# Patient Record
Sex: FEMALE | Race: BLACK OR AFRICAN AMERICAN | NOT HISPANIC OR LATINO | Employment: FULL TIME | URBAN - METROPOLITAN AREA
[De-identification: names, ages, dates, MRNs, and addresses within clinical notes are randomized per-mention and may not be internally consistent; named-entity substitution may affect disease eponyms.]

---

## 2021-04-28 ENCOUNTER — PATIENT MESSAGE (OUTPATIENT)
Dept: RESEARCH | Facility: HOSPITAL | Age: 41
End: 2021-04-28

## 2024-04-08 ENCOUNTER — OFFICE VISIT (OUTPATIENT)
Dept: NEUROLOGY | Facility: CLINIC | Age: 44
End: 2024-04-08
Payer: COMMERCIAL

## 2024-04-08 VITALS
SYSTOLIC BLOOD PRESSURE: 118 MMHG | BODY MASS INDEX: 35.43 KG/M2 | HEART RATE: 75 BPM | RESPIRATION RATE: 16 BRPM | HEIGHT: 66 IN | WEIGHT: 220.44 LBS | OXYGEN SATURATION: 99 % | DIASTOLIC BLOOD PRESSURE: 84 MMHG

## 2024-04-08 DIAGNOSIS — D17.1 LIPOMA OF TORSO: ICD-10-CM

## 2024-04-08 DIAGNOSIS — K44.9 HIATAL HERNIA: ICD-10-CM

## 2024-04-08 DIAGNOSIS — M54.18 LEFT SACRAL RADICULOPATHY: Primary | ICD-10-CM

## 2024-04-08 DIAGNOSIS — Q39.4 TERMINAL ESOPHAGEAL WEB: ICD-10-CM

## 2024-04-08 DIAGNOSIS — R20.2 PARESTHESIAS: ICD-10-CM

## 2024-04-08 DIAGNOSIS — N60.12 DIFFUSE CYSTIC MASTOPATHY OF BOTH BREASTS: ICD-10-CM

## 2024-04-08 DIAGNOSIS — J30.89 ENVIRONMENTAL AND SEASONAL ALLERGIES: ICD-10-CM

## 2024-04-08 DIAGNOSIS — S32.2XXS CLOSED FRACTURE OF SACRUM AND COCCYX, SEQUELA: ICD-10-CM

## 2024-04-08 DIAGNOSIS — S32.10XS CLOSED FRACTURE OF SACRUM AND COCCYX, SEQUELA: ICD-10-CM

## 2024-04-08 DIAGNOSIS — D50.8 IRON DEFICIENCY ANEMIA SECONDARY TO INADEQUATE DIETARY IRON INTAKE: ICD-10-CM

## 2024-04-08 DIAGNOSIS — F32.A ANXIETY AND DEPRESSION: ICD-10-CM

## 2024-04-08 DIAGNOSIS — N60.11 DIFFUSE CYSTIC MASTOPATHY OF BOTH BREASTS: ICD-10-CM

## 2024-04-08 DIAGNOSIS — R73.03 PREDIABETES: ICD-10-CM

## 2024-04-08 DIAGNOSIS — Z80.3 FAMILY HISTORY OF BREAST CANCER: ICD-10-CM

## 2024-04-08 DIAGNOSIS — F41.9 ANXIETY AND DEPRESSION: ICD-10-CM

## 2024-04-08 DIAGNOSIS — N83.209 CYST OF OVARY, UNSPECIFIED LATERALITY: ICD-10-CM

## 2024-04-08 DIAGNOSIS — F51.01 PRIMARY INSOMNIA: ICD-10-CM

## 2024-04-08 DIAGNOSIS — M25.552 LEFT HIP PAIN: ICD-10-CM

## 2024-04-08 PROBLEM — S32.2XXA FX SACRUM/COCCYX-CLOSED: Status: ACTIVE | Noted: 2024-04-08

## 2024-04-08 PROBLEM — F32.0 MILD MAJOR DEPRESSION: Status: ACTIVE | Noted: 2021-02-26

## 2024-04-08 PROBLEM — E61.1 IRON DEFICIENCY: Status: RESOLVED | Noted: 2017-05-31 | Resolved: 2024-04-08

## 2024-04-08 PROBLEM — E61.1 IRON DEFICIENCY: Status: ACTIVE | Noted: 2017-05-31

## 2024-04-08 PROBLEM — S32.10XA FX SACRUM/COCCYX-CLOSED: Status: ACTIVE | Noted: 2024-04-08

## 2024-04-08 PROBLEM — D64.9 ANEMIA: Status: ACTIVE | Noted: 2024-04-08

## 2024-04-08 PROBLEM — F32.0 MILD MAJOR DEPRESSION: Status: RESOLVED | Noted: 2021-02-26 | Resolved: 2024-04-08

## 2024-04-08 PROCEDURE — 99205 OFFICE O/P NEW HI 60 MIN: CPT | Mod: S$GLB,,, | Performed by: PSYCHIATRY & NEUROLOGY

## 2024-04-08 PROCEDURE — 3044F HG A1C LEVEL LT 7.0%: CPT | Mod: CPTII,S$GLB,, | Performed by: PSYCHIATRY & NEUROLOGY

## 2024-04-08 PROCEDURE — 3008F BODY MASS INDEX DOCD: CPT | Mod: CPTII,S$GLB,, | Performed by: PSYCHIATRY & NEUROLOGY

## 2024-04-08 PROCEDURE — 1159F MED LIST DOCD IN RCRD: CPT | Mod: CPTII,S$GLB,, | Performed by: PSYCHIATRY & NEUROLOGY

## 2024-04-08 PROCEDURE — 3074F SYST BP LT 130 MM HG: CPT | Mod: CPTII,S$GLB,, | Performed by: PSYCHIATRY & NEUROLOGY

## 2024-04-08 PROCEDURE — 3079F DIAST BP 80-89 MM HG: CPT | Mod: CPTII,S$GLB,, | Performed by: PSYCHIATRY & NEUROLOGY

## 2024-04-08 PROCEDURE — 99999 PR PBB SHADOW E&M-EST. PATIENT-LVL IV: CPT | Mod: PBBFAC,,, | Performed by: PSYCHIATRY & NEUROLOGY

## 2024-04-08 RX ORDER — TIRZEPATIDE 2.5 MG/.5ML
2.5 INJECTION, SOLUTION SUBCUTANEOUS
COMMUNITY
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
## 2024-04-17 ENCOUNTER — PROCEDURE VISIT (OUTPATIENT)
Dept: NEUROLOGY | Facility: CLINIC | Age: 44
End: 2024-04-17
Payer: COMMERCIAL

## 2024-04-17 ENCOUNTER — PATIENT MESSAGE (OUTPATIENT)
Dept: NEUROLOGY | Facility: CLINIC | Age: 44
End: 2024-04-17

## 2024-04-17 DIAGNOSIS — R20.2 PARESTHESIAS: ICD-10-CM

## 2024-04-17 DIAGNOSIS — S32.2XXS CLOSED FRACTURE OF SACRUM AND COCCYX, SEQUELA: ICD-10-CM

## 2024-04-17 DIAGNOSIS — M54.18 LEFT SACRAL RADICULOPATHY: ICD-10-CM

## 2024-04-17 DIAGNOSIS — S32.10XS CLOSED FRACTURE OF SACRUM AND COCCYX, SEQUELA: ICD-10-CM

## 2024-04-17 PROCEDURE — 95910 NRV CNDJ TEST 7-8 STUDIES: CPT | Mod: S$GLB,,, | Performed by: PSYCHIATRY & NEUROLOGY
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
## 2024-04-26 ENCOUNTER — TELEPHONE (OUTPATIENT)
Dept: NEUROLOGY | Facility: CLINIC | Age: 44
End: 2024-04-26
Payer: COMMERCIAL

## 2024-04-26 ENCOUNTER — HOSPITAL ENCOUNTER (OUTPATIENT)
Dept: RADIOLOGY | Facility: HOSPITAL | Age: 44
Discharge: HOME | End: 2024-04-26
Attending: PSYCHIATRY & NEUROLOGY
Payer: COMMERCIAL

## 2024-04-26 DIAGNOSIS — S32.10XS CLOSED FRACTURE OF SACRUM AND COCCYX, SEQUELA: ICD-10-CM

## 2024-04-26 DIAGNOSIS — M54.18 LEFT SACRAL RADICULOPATHY: ICD-10-CM

## 2024-04-26 DIAGNOSIS — S32.2XXS CLOSED FRACTURE OF SACRUM AND COCCYX, SEQUELA: ICD-10-CM

## 2024-04-26 DIAGNOSIS — R20.2 PARESTHESIAS: ICD-10-CM

## 2024-04-26 PROCEDURE — 25500020 PHARM REV CODE 255: Performed by: PSYCHIATRY & NEUROLOGY

## 2024-04-26 PROCEDURE — 72158 MRI LUMBAR SPINE W/O & W/DYE: CPT | Mod: 26,,, | Performed by: RADIOLOGY

## 2024-04-26 PROCEDURE — 72158 MRI LUMBAR SPINE W/O & W/DYE: CPT | Mod: TC

## 2024-04-26 PROCEDURE — A9585 GADOBUTROL INJECTION: HCPCS | Performed by: PSYCHIATRY & NEUROLOGY

## 2024-04-26 RX ORDER — GADOBUTROL 604.72 MG/ML
10 INJECTION INTRAVENOUS
Status: COMPLETED | OUTPATIENT
Start: 2024-04-26 | End: 2024-04-26

## 2024-04-26 RX ADMIN — GADOBUTROL 10 ML: 604.72 INJECTION INTRAVENOUS at 12:04

## 2024-04-26 NOTE — TELEPHONE ENCOUNTER
----- Message from Crissy Menchaca MD sent at 4/26/2024  4:19 PM CDT -----    04-    LS-Spine MRI Chronic DDD L5-S1. No acute (new changes).

## 2024-05-06 ENCOUNTER — TELEPHONE (OUTPATIENT)
Dept: NEUROLOGY | Facility: CLINIC | Age: 44
End: 2024-05-06
Payer: COMMERCIAL

## 2024-05-06 NOTE — TELEPHONE ENCOUNTER
----- Message from Susana Ann sent at 5/6/2024  4:08 PM CDT -----  Type:  Patient Returning Call    Who Called:pt   Who Left Message for Patient:Jennifer   Does the patient know what this is regarding?:results   Would the patient rather a call back or a response via [facility]? Call   Best Call Back Number:[phone]  Additional Information:

## 2024-08-20 ENCOUNTER — TELEPHONE (OUTPATIENT)
Dept: PAIN MEDICINE | Facility: CLINIC | Age: 44
End: 2024-08-20
Payer: COMMERCIAL

## 2024-11-19 DIAGNOSIS — D50.8 IRON DEFICIENCY ANEMIA SECONDARY TO INADEQUATE DIETARY IRON INTAKE: Primary | ICD-10-CM

## 2024-12-04 ENCOUNTER — LAB VISIT (OUTPATIENT)
Dept: LAB | Facility: HOSPITAL | Age: 44
End: 2024-12-04
Attending: INTERNAL MEDICINE
Payer: COMMERCIAL

## 2024-12-04 DIAGNOSIS — D50.8 IRON DEFICIENCY ANEMIA SECONDARY TO INADEQUATE DIETARY IRON INTAKE: ICD-10-CM

## 2024-12-04 LAB
FERRITIN SERPL-MCNC: 6 NG/ML (ref 20–300)
IRON SERPL-MCNC: 18 UG/DL (ref 30–160)
SATURATED IRON: 4 % (ref 20–50)
TOTAL IRON BINDING CAPACITY: 425 UG/DL (ref 250–450)
TRANSFERRIN SERPL-MCNC: 287 MG/DL (ref 200–375)

## 2024-12-04 PROCEDURE — 82728 ASSAY OF FERRITIN: CPT | Performed by: INTERNAL MEDICINE

## 2024-12-04 PROCEDURE — 36415 COLL VENOUS BLD VENIPUNCTURE: CPT | Performed by: INTERNAL MEDICINE

## 2024-12-04 PROCEDURE — 84466 ASSAY OF TRANSFERRIN: CPT | Performed by: INTERNAL MEDICINE

## 2024-12-05 ENCOUNTER — OFFICE VISIT (OUTPATIENT)
Dept: HEMATOLOGY/ONCOLOGY | Facility: CLINIC | Age: 44
End: 2024-12-05
Payer: COMMERCIAL

## 2024-12-05 ENCOUNTER — LAB VISIT (OUTPATIENT)
Dept: LAB | Facility: HOSPITAL | Age: 44
End: 2024-12-05
Attending: INTERNAL MEDICINE
Payer: COMMERCIAL

## 2024-12-05 DIAGNOSIS — D50.0 IRON DEFICIENCY ANEMIA DUE TO CHRONIC BLOOD LOSS: Primary | ICD-10-CM

## 2024-12-05 DIAGNOSIS — D50.0 IRON DEFICIENCY ANEMIA DUE TO CHRONIC BLOOD LOSS: ICD-10-CM

## 2024-12-05 LAB
BASOPHILS # BLD AUTO: 0.06 K/UL (ref 0–0.2)
BASOPHILS NFR BLD: 1 % (ref 0–1.9)
DIFFERENTIAL METHOD BLD: ABNORMAL
EOSINOPHIL # BLD AUTO: 0.1 K/UL (ref 0–0.5)
EOSINOPHIL NFR BLD: 2.1 % (ref 0–8)
ERYTHROCYTE [DISTWIDTH] IN BLOOD BY AUTOMATED COUNT: 17.3 % (ref 11.5–14.5)
HCT VFR BLD AUTO: 28.4 % (ref 37–48.5)
HGB BLD-MCNC: 8.1 G/DL (ref 12–16)
IMM GRANULOCYTES # BLD AUTO: 0.01 K/UL (ref 0–0.04)
IMM GRANULOCYTES NFR BLD AUTO: 0.2 % (ref 0–0.5)
LYMPHOCYTES # BLD AUTO: 2 K/UL (ref 1–4.8)
LYMPHOCYTES NFR BLD: 35.1 % (ref 18–48)
MCH RBC QN AUTO: 21.1 PG (ref 27–31)
MCHC RBC AUTO-ENTMCNC: 28.5 G/DL (ref 32–36)
MCV RBC AUTO: 74 FL (ref 82–98)
MONOCYTES # BLD AUTO: 0.5 K/UL (ref 0.3–1)
MONOCYTES NFR BLD: 8.6 % (ref 4–15)
NEUTROPHILS # BLD AUTO: 3 K/UL (ref 1.8–7.7)
NEUTROPHILS NFR BLD: 53 % (ref 38–73)
NRBC BLD-RTO: 0 /100 WBC
PLATELET # BLD AUTO: 388 K/UL (ref 150–450)
PMV BLD AUTO: 9.2 FL (ref 9.2–12.9)
RBC # BLD AUTO: 3.83 M/UL (ref 4–5.4)
WBC # BLD AUTO: 5.72 K/UL (ref 3.9–12.7)

## 2024-12-05 PROCEDURE — 85025 COMPLETE CBC W/AUTO DIFF WBC: CPT | Performed by: INTERNAL MEDICINE

## 2024-12-05 PROCEDURE — 36415 COLL VENOUS BLD VENIPUNCTURE: CPT | Performed by: INTERNAL MEDICINE

## 2024-12-05 PROCEDURE — 3044F HG A1C LEVEL LT 7.0%: CPT | Mod: CPTII,95,, | Performed by: INTERNAL MEDICINE

## 2024-12-05 PROCEDURE — 99204 OFFICE O/P NEW MOD 45 MIN: CPT | Mod: 95,,, | Performed by: INTERNAL MEDICINE

## 2024-12-05 NOTE — Clinical Note
CBC today. Will order IV iron in [place] once I have the CBC results. RTC 4 weeks after iron infusion with labs

## 2024-12-05 NOTE — PROGRESS NOTES
The patient location is: home  Visit type: Virtual visit with synchronous audio and video  Face-to-face or time spent with patient on the encounter: 25 min  Total time spent on and for  this encounter which includes non face-to-face time preparing to see patient, review of tests, obtaining and or reviewing separately obtained records documenting clinical information in the electronic or other health records, independently interpreting results which is not separately reported ,and communicating results to the patient/family/caregiver and in care coordination and treatment planning/communicating with pharmacy for prescriptions/addressing social needs/arranging follow-up and or referrals : 25 min     Each patient I provide medical services by telemedicine is:  (1) informed of the relationship between the physician and patient and the respective role of any other health care provider with respect to management of the patient; and (2) notified that he or she may decline to receive medical services by telemedicine and may withdraw from such care at any time.  This is a video visit therefore some elements of the physical exam such as vital signs, heart sounds are breath sounds are not included and may be included if found in recent clinic notes of other providers assessing same patient. Any symptoms or signs that were visualized were stated by the patient may be included in this note.      Service Date:  12/5/24    Chief Complaint: iron deficiency anemia    Nuria Alvarado is a 44 y.o. female here with iron deficiency anemia.  Secondary to menorrhagia.  Has tried oral iron without much relief.  Also mostly intolerant to it due to GI upset.  Complains of fatigue.  Also has restless leg syndrome believe to be from her low iron.  Has never had iron infusion in the past.    Review of Systems   Constitutional: Negative.  Negative for appetite change and unexpected weight change.   HENT: Negative.  Negative for mouth sores.     Eyes: Negative.  Negative for visual disturbance.   Respiratory: Negative.  Negative for cough and shortness of breath.    Cardiovascular: Negative.  Negative for chest pain.   Gastrointestinal: Negative.  Negative for abdominal pain and diarrhea.   Endocrine: Negative.    Genitourinary: Negative.  Negative for frequency.   Musculoskeletal: Negative.  Negative for back pain.   Integumentary:  Negative for rash. Negative.   Neurological: Negative.  Negative for headaches.   Hematological: Negative.  Negative for adenopathy.   Psychiatric/Behavioral:  The patient is nervous/anxious.         Current Outpatient Medications   Medication Instructions    buPROPion (WELLBUTRIN XL) 300 MG 24 hr tablet No dose, route, or frequency recorded.    iron fum,ps-FA-vit B,C#18-Lact (FUSION PLUS) 130 mg iron -1,250 mcg Cap 1 tablet, Oral, Every morning    MOUNJARO 2.5 mg, Subcutaneous, Every 7 days    omeprazole (PRILOSEC) 40 mg, Oral        Past Medical History:   Diagnosis Date    Abnormal mammogram     Depression     Fibroids     Gastric reflux     History of abnormal cervical Pap smear         Past Surgical History:   Procedure Laterality Date    BREAST BIOPSY  09/09/2022    benign    DILATION AND CURETTAGE OF UTERUS      ESOPHAGOGASTRODUODENOSCOPY      LIPOSUCTION      WISDOM TOOTH EXTRACTION          Family History   Problem Relation Name Age of Onset    Hypertension Maternal Grandmother      Alzheimer's disease Paternal Grandmother      Breast cancer Other AUNT     Diabetes Other         Social History     Tobacco Use    Smoking status: Never    Smokeless tobacco: Never   Substance Use Topics    Alcohol use: Yes     Comment: Social    Drug use: Never         There were no vitals filed for this visit.     Physical Exam:  There were no vitals taken for this visit.    Physical Exam  Constitutional:       Appearance: Normal appearance.   HENT:      Head: Normocephalic and atraumatic.      Nose: Nose normal.      Mouth/Throat:       Mouth: Mucous membranes are moist.      Pharynx: Oropharynx is clear.   Eyes:      Conjunctiva/sclera: Conjunctivae normal.   Cardiovascular:      Rate and Rhythm: Normal rate and regular rhythm.      Heart sounds: Normal heart sounds.   Pulmonary:      Effort: Pulmonary effort is normal.      Breath sounds: Normal breath sounds.   Abdominal:      General: Abdomen is flat. Bowel sounds are normal.      Palpations: Abdomen is soft.   Musculoskeletal:         General: Normal range of motion.      Cervical back: Normal range of motion and neck supple.   Skin:     General: Skin is warm and dry.   Neurological:      General: No focal deficit present.      Mental Status: She is alert and oriented to person, place, and time. Mental status is at baseline.   Psychiatric:         Mood and Affect: Mood normal.          Labs:  Lab Results   Component Value Date    WBC 7.54 06/25/2008    RBC 4.58 06/25/2008    HGB 11.9 (L) 06/25/2008    HCT 38.3 06/25/2008    MCV 83.6 06/25/2008    MCH 26.0 (L) 06/25/2008    MCHC 31.1 (L) 06/25/2008    RDW 13.7 06/25/2008     06/25/2008    MPV 9.4 06/25/2008    GRAN 3.7 06/25/2008    GRAN 48.9 06/25/2008    LYMPH 39.0 06/25/2008    LYMPH 2.9 06/25/2008    MONO 8.4 (H) 06/25/2008    MONO 0.6 06/25/2008    EOS 0.3 06/25/2008    BASO 0.0 06/25/2008    EOSINOPHIL 3.4 06/25/2008    BASOPHIL 0.3 06/25/2008     Sodium   Date Value Ref Range Status   06/25/2008 142 136 - 145 mMol/l Final     Potassium   Date Value Ref Range Status   06/25/2008 3.6 3.5 - 5.1 mMol/l Final     Chloride   Date Value Ref Range Status   06/25/2008 108 95 - 110 mMol/l Final     CO2   Date Value Ref Range Status   06/25/2008 22 (L) 23.0 - 29.0 mEq/L Final     Glucose   Date Value Ref Range Status   06/25/2008 85 70 - 110 mg/dl Final     BUN   Date Value Ref Range Status   06/25/2008 9 6 - 20 mg/dl Final     Creatinine   Date Value Ref Range Status   06/25/2008 0.9 0.5 - 1.4 mg/dl Final     Calcium   Date Value Ref Range  Status   06/25/2008 9.3 8.7 - 10.5 mg/dl Final     Total Protein   Date Value Ref Range Status   06/25/2008 7.1 6.0 - 8.4 gm/dl Final     Albumin   Date Value Ref Range Status   06/25/2008 4.3 3.5 - 5.2 g/dl Final     Total Bilirubin   Date Value Ref Range Status   06/25/2008 0.9 0.1 - 1.0 mg/dl Final     Comment:     For infants and newborns, interpretation of results should be based  on gestational age, weight and in agreement with clinical  observations.  .  Premature Infant recommended reference ranges:  Up to 24 hours.............<8.0 mg/dl  Up to 48 hours............<12.0 mg/dl  3-5 days..................<15.0 mg/dl  6-29 days.................<15.0 mg/dl       Alkaline Phosphatase   Date Value Ref Range Status   06/25/2008 61 55 - 135 U/L Final     AST   Date Value Ref Range Status   06/25/2008 15 10 - 40 U/L Final     ALT   Date Value Ref Range Status   06/25/2008 13 10 - 44 U/L Final       A/P:    Iron deficiency anemia  -due to menorrhagia   -intolerant to oral iron with GI upset   -I will check a CBC today and based on those results, it will help me determine how much IV iron to give.  I will then order IV iron infusions in [place] and have her return to clinic 4 weeks after to check on response.  I am hoping it will help with her restless legs syndrome      Aurash Khoobehi, MD  Hematology and Oncology

## 2024-12-06 ENCOUNTER — PATIENT MESSAGE (OUTPATIENT)
Dept: HEMATOLOGY/ONCOLOGY | Facility: CLINIC | Age: 44
End: 2024-12-06
Payer: COMMERCIAL

## 2024-12-06 RX ORDER — HEPARIN 100 UNIT/ML
500 SYRINGE INTRAVENOUS
OUTPATIENT
Start: 2024-12-06

## 2024-12-06 RX ORDER — DIPHENHYDRAMINE HYDROCHLORIDE 50 MG/ML
50 INJECTION INTRAMUSCULAR; INTRAVENOUS ONCE AS NEEDED
OUTPATIENT
Start: 2024-12-06

## 2024-12-06 RX ORDER — SODIUM CHLORIDE 0.9 % (FLUSH) 0.9 %
10 SYRINGE (ML) INJECTION
OUTPATIENT
Start: 2024-12-06

## 2024-12-06 RX ORDER — EPINEPHRINE 0.3 MG/.3ML
0.3 INJECTION SUBCUTANEOUS ONCE AS NEEDED
OUTPATIENT
Start: 2024-12-06

## 2024-12-17 ENCOUNTER — INFUSION (OUTPATIENT)
Dept: INFUSION THERAPY | Facility: HOSPITAL | Age: 44
End: 2024-12-17
Attending: INTERNAL MEDICINE
Payer: COMMERCIAL

## 2024-12-17 VITALS
RESPIRATION RATE: 16 BRPM | SYSTOLIC BLOOD PRESSURE: 123 MMHG | HEART RATE: 80 BPM | DIASTOLIC BLOOD PRESSURE: 79 MMHG | OXYGEN SATURATION: 99 % | TEMPERATURE: 98 F

## 2024-12-17 DIAGNOSIS — D50.8 IRON DEFICIENCY ANEMIA SECONDARY TO INADEQUATE DIETARY IRON INTAKE: Primary | ICD-10-CM

## 2024-12-17 PROCEDURE — 25000003 PHARM REV CODE 250: Performed by: INTERNAL MEDICINE

## 2024-12-17 PROCEDURE — 96365 THER/PROPH/DIAG IV INF INIT: CPT

## 2024-12-17 PROCEDURE — 63600175 PHARM REV CODE 636 W HCPCS: Mod: JZ,JG | Performed by: INTERNAL MEDICINE

## 2024-12-17 RX ORDER — EPINEPHRINE 0.3 MG/.3ML
0.3 INJECTION SUBCUTANEOUS ONCE AS NEEDED
Status: DISCONTINUED | OUTPATIENT
Start: 2024-12-17 | End: 2024-12-17 | Stop reason: HOSPADM

## 2024-12-17 RX ORDER — HEPARIN 100 UNIT/ML
500 SYRINGE INTRAVENOUS
OUTPATIENT
Start: 2024-12-17

## 2024-12-17 RX ORDER — SODIUM CHLORIDE 0.9 % (FLUSH) 0.9 %
10 SYRINGE (ML) INJECTION
OUTPATIENT
Start: 2024-12-17

## 2024-12-17 RX ORDER — DIPHENHYDRAMINE HYDROCHLORIDE 50 MG/ML
50 INJECTION INTRAMUSCULAR; INTRAVENOUS ONCE AS NEEDED
Status: DISCONTINUED | OUTPATIENT
Start: 2024-12-17 | End: 2024-12-17 | Stop reason: HOSPADM

## 2024-12-17 RX ORDER — DIPHENHYDRAMINE HYDROCHLORIDE 50 MG/ML
50 INJECTION INTRAMUSCULAR; INTRAVENOUS ONCE AS NEEDED
OUTPATIENT
Start: 2024-12-17

## 2024-12-17 RX ORDER — EPINEPHRINE 0.3 MG/.3ML
0.3 INJECTION SUBCUTANEOUS ONCE AS NEEDED
OUTPATIENT
Start: 2024-12-17

## 2024-12-17 RX ADMIN — FERUMOXYTOL 510 MG: 510 INJECTION INTRAVENOUS at 02:12

## 2024-12-17 NOTE — PLAN OF CARE
Problem: Adult Inpatient Plan of Care  Goal: Plan of Care Review  Outcome: Progressing  Flowsheets (Taken 12/17/2024 1516)  Plan of Care Reviewed With: patient  Goal: Patient-Specific Goal (Individualized)  Outcome: Progressing  Flowsheets (Taken 12/17/2024 1516)  Individualized Care Needs: patient likes feet elevated, pillow under IV arm, and warm blanket  Anxieties, Fears or Concerns: Patient reports feeling tired all the time  Patient/Family-Specific Goals (Include Timeframe): patient tolerated first does of IV ferahem well with no adverse reactions.     Problem: Anemia  Goal: Anemia Symptom Improvement  Outcome: Progressing

## 2024-12-17 NOTE — DISCHARGE INSTRUCTIONS
.[facility]  [address] [address]  [address]  [phone] phone     [phone] fax  Hours of Operation: Monday- Friday 8:00am- 5:00pm  After hours phone  [phone]  Hematology / Oncology Physicians on call    Dr. Alon Choi        Nurse Practitioners:    Oneida Stokes, STEPHY Card, STEPHY So, STEPHY Regalado, STEPHY Carter, PA      Please don't hesitate to call if you have any concerns.

## 2024-12-24 ENCOUNTER — TELEPHONE (OUTPATIENT)
Dept: INFUSION THERAPY | Facility: HOSPITAL | Age: 44
End: 2024-12-24
Payer: COMMERCIAL

## 2025-01-03 ENCOUNTER — INFUSION (OUTPATIENT)
Dept: INFUSION THERAPY | Facility: HOSPITAL | Age: 45
End: 2025-01-03
Attending: INTERNAL MEDICINE
Payer: COMMERCIAL

## 2025-01-03 VITALS
HEART RATE: 70 BPM | OXYGEN SATURATION: 100 % | TEMPERATURE: 98 F | SYSTOLIC BLOOD PRESSURE: 108 MMHG | RESPIRATION RATE: 16 BRPM | DIASTOLIC BLOOD PRESSURE: 74 MMHG

## 2025-01-03 DIAGNOSIS — D50.8 IRON DEFICIENCY ANEMIA SECONDARY TO INADEQUATE DIETARY IRON INTAKE: Primary | ICD-10-CM

## 2025-01-03 PROCEDURE — 96374 THER/PROPH/DIAG INJ IV PUSH: CPT

## 2025-01-03 PROCEDURE — 25000003 PHARM REV CODE 250: Performed by: INTERNAL MEDICINE

## 2025-01-03 PROCEDURE — 63600175 PHARM REV CODE 636 W HCPCS: Mod: JZ,TB | Performed by: INTERNAL MEDICINE

## 2025-01-03 RX ORDER — DIPHENHYDRAMINE HYDROCHLORIDE 50 MG/ML
50 INJECTION INTRAMUSCULAR; INTRAVENOUS ONCE AS NEEDED
OUTPATIENT
Start: 2025-01-03

## 2025-01-03 RX ORDER — EPINEPHRINE 0.3 MG/.3ML
0.3 INJECTION SUBCUTANEOUS ONCE AS NEEDED
OUTPATIENT
Start: 2025-01-03

## 2025-01-03 RX ORDER — DIPHENHYDRAMINE HYDROCHLORIDE 50 MG/ML
50 INJECTION INTRAMUSCULAR; INTRAVENOUS ONCE AS NEEDED
Status: DISCONTINUED | OUTPATIENT
Start: 2025-01-03 | End: 2025-01-03 | Stop reason: HOSPADM

## 2025-01-03 RX ORDER — SODIUM CHLORIDE 0.9 % (FLUSH) 0.9 %
10 SYRINGE (ML) INJECTION
OUTPATIENT
Start: 2025-01-03

## 2025-01-03 RX ORDER — HEPARIN 100 UNIT/ML
500 SYRINGE INTRAVENOUS
OUTPATIENT
Start: 2025-01-03

## 2025-01-03 RX ORDER — EPINEPHRINE 0.3 MG/.3ML
0.3 INJECTION SUBCUTANEOUS ONCE AS NEEDED
Status: DISCONTINUED | OUTPATIENT
Start: 2025-01-03 | End: 2025-01-03 | Stop reason: HOSPADM

## 2025-01-03 RX ADMIN — FERUMOXYTOL 510 MG: 510 INJECTION INTRAVENOUS at 03:01

## 2025-01-03 NOTE — PLAN OF CARE
Problem: Adult Inpatient Plan of Care  Goal: Plan of Care Review  Outcome: Progressing  Flowsheets (Taken 1/3/2025 1520)  Plan of Care Reviewed With: patient  Goal: Optimal Comfort and Wellbeing  Outcome: Progressing  Intervention: Provide Person-Centered Care  Flowsheets (Taken 1/3/2025 1520)  Trust Relationship/Rapport:   care explained   choices provided   emotional support provided   empathic listening provided   questions answered   questions encouraged   reassurance provided   thoughts/feelings acknowledged

## 2025-01-29 ENCOUNTER — TELEPHONE (OUTPATIENT)
Dept: HEMATOLOGY/ONCOLOGY | Facility: CLINIC | Age: 45
End: 2025-01-29
Payer: COMMERCIAL

## 2025-02-01 ENCOUNTER — LAB VISIT (OUTPATIENT)
Dept: LAB | Facility: HOSPITAL | Age: 45
End: 2025-02-01
Attending: INTERNAL MEDICINE
Payer: COMMERCIAL

## 2025-02-01 DIAGNOSIS — D50.0 IRON DEFICIENCY ANEMIA DUE TO CHRONIC BLOOD LOSS: ICD-10-CM

## 2025-02-01 LAB
BASOPHILS # BLD AUTO: 0.06 K/UL (ref 0–0.2)
BASOPHILS NFR BLD: 1.1 % (ref 0–1.9)
DIFFERENTIAL METHOD BLD: ABNORMAL
EOSINOPHIL # BLD AUTO: 0.1 K/UL (ref 0–0.5)
EOSINOPHIL NFR BLD: 1.9 % (ref 0–8)
ERYTHROCYTE [DISTWIDTH] IN BLOOD BY AUTOMATED COUNT: 23.7 % (ref 11.5–14.5)
FERRITIN SERPL-MCNC: 81 NG/ML (ref 20–300)
HCT VFR BLD AUTO: 38 % (ref 37–48.5)
HGB BLD-MCNC: 11.5 G/DL (ref 12–16)
IMM GRANULOCYTES # BLD AUTO: 0.01 K/UL (ref 0–0.04)
IMM GRANULOCYTES NFR BLD AUTO: 0.2 % (ref 0–0.5)
LYMPHOCYTES # BLD AUTO: 2 K/UL (ref 1–4.8)
LYMPHOCYTES NFR BLD: 37.4 % (ref 18–48)
MCH RBC QN AUTO: 24.6 PG (ref 27–31)
MCHC RBC AUTO-ENTMCNC: 30.3 G/DL (ref 32–36)
MCV RBC AUTO: 81 FL (ref 82–98)
MONOCYTES # BLD AUTO: 0.4 K/UL (ref 0.3–1)
MONOCYTES NFR BLD: 8.1 % (ref 4–15)
NEUTROPHILS # BLD AUTO: 2.7 K/UL (ref 1.8–7.7)
NEUTROPHILS NFR BLD: 51.3 % (ref 38–73)
NRBC BLD-RTO: 0 /100 WBC
PLATELET # BLD AUTO: 246 K/UL (ref 150–450)
PMV BLD AUTO: 9.4 FL (ref 9.2–12.9)
RBC # BLD AUTO: 4.67 M/UL (ref 4–5.4)
WBC # BLD AUTO: 5.29 K/UL (ref 3.9–12.7)

## 2025-02-01 PROCEDURE — 36415 COLL VENOUS BLD VENIPUNCTURE: CPT | Performed by: INTERNAL MEDICINE

## 2025-02-01 PROCEDURE — 82728 ASSAY OF FERRITIN: CPT | Performed by: INTERNAL MEDICINE

## 2025-02-01 PROCEDURE — 85025 COMPLETE CBC W/AUTO DIFF WBC: CPT | Performed by: INTERNAL MEDICINE

## 2025-02-20 ENCOUNTER — OFFICE VISIT (OUTPATIENT)
Dept: HEMATOLOGY/ONCOLOGY | Facility: CLINIC | Age: 45
End: 2025-02-20
Payer: COMMERCIAL

## 2025-02-20 DIAGNOSIS — G25.81 RLS (RESTLESS LEGS SYNDROME): ICD-10-CM

## 2025-02-20 DIAGNOSIS — D50.0 IRON DEFICIENCY ANEMIA DUE TO CHRONIC BLOOD LOSS: Primary | ICD-10-CM

## 2025-02-20 NOTE — PROGRESS NOTES
The patient location is: home  Visit type: Virtual visit with synchronous audio and video  Face-to-face or time spent with patient on the encounter: 25 min  Total time spent on and for  this encounter which includes non face-to-face time preparing to see patient, review of tests, obtaining and or reviewing separately obtained records documenting clinical information in the electronic or other health records, independently interpreting results which is not separately reported ,and communicating results to the patient/family/caregiver and in care coordination and treatment planning/communicating with pharmacy for prescriptions/addressing social needs/arranging follow-up and or referrals : 25 min     Each patient I provide medical services by telemedicine is:  (1) informed of the relationship between the physician and patient and the respective role of any other health care provider with respect to management of the patient; and (2) notified that he or she may decline to receive medical services by telemedicine and may withdraw from such care at any time.  This is a video visit therefore some elements of the physical exam such as vital signs, heart sounds are breath sounds are not included and may be included if found in recent clinic notes of other providers assessing same patient. Any symptoms or signs that were visualized were stated by the patient may be included in this note.      Service Date:  2/20/25    Chief Complaint: iron deficiency anemia    Nuria Alvarado is a 44 y.o. female here with iron deficiency anemia.  Secondary to menorrhagia.  Responded well to IV iron.  Restless legs syndrome improved.  Fatigue improved but states that it was multifactorial.  No new complaints.    Review of Systems   Constitutional: Negative.  Negative for appetite change and unexpected weight change.   HENT: Negative.  Negative for mouth sores.    Eyes: Negative.  Negative for visual disturbance.   Respiratory: Negative.   Negative for cough and shortness of breath.    Cardiovascular: Negative.  Negative for chest pain.   Gastrointestinal: Negative.  Negative for abdominal pain and diarrhea.   Endocrine: Negative.    Genitourinary: Negative.  Negative for frequency.   Musculoskeletal: Negative.  Negative for back pain.   Integumentary:  Negative for rash. Negative.   Neurological: Negative.  Negative for headaches.   Hematological: Negative.  Negative for adenopathy.   Psychiatric/Behavioral:  The patient is nervous/anxious.         Current Outpatient Medications   Medication Instructions    buPROPion (WELLBUTRIN XL) 300 MG 24 hr tablet No dose, route, or frequency recorded.    iron fum,ps-FA-vit B,C#18-Lact (FUSION PLUS) 130 mg iron -1,250 mcg Cap 1 tablet, Oral, Every morning    MOUNJARO 2.5 mg, Subcutaneous, Every 7 days    omeprazole (PRILOSEC) 40 mg, Oral        Past Medical History:   Diagnosis Date    Anemia     Depression     Fibroids     Gastric reflux     History of abnormal cervical Pap smear     History of abnormal mammogram         Past Surgical History:   Procedure Laterality Date    BREAST BIOPSY  09/09/2022    benign    DILATION AND CURETTAGE OF UTERUS      ESOPHAGOGASTRODUODENOSCOPY      LIPOSUCTION      WISDOM TOOTH EXTRACTION          Family History   Problem Relation Name Age of Onset    Hypertension Maternal Grandmother      Alzheimer's disease Paternal Grandmother      Breast cancer Other AUNT     Diabetes Other         Social History     Tobacco Use    Smoking status: Never    Smokeless tobacco: Never   Substance Use Topics    Alcohol use: Yes     Comment: Social    Drug use: Never         There were no vitals filed for this visit.     Physical Exam:  LMP 01/18/2025     Physical Exam  Constitutional:       Appearance: Normal appearance.   HENT:      Head: Normocephalic and atraumatic.      Nose: Nose normal.      Mouth/Throat:      Mouth: Mucous membranes are moist.      Pharynx: Oropharynx is clear.   Eyes:       Conjunctiva/sclera: Conjunctivae normal.   Cardiovascular:      Rate and Rhythm: Normal rate and regular rhythm.      Heart sounds: Normal heart sounds.   Pulmonary:      Effort: Pulmonary effort is normal.      Breath sounds: Normal breath sounds.   Abdominal:      General: Abdomen is flat. Bowel sounds are normal.      Palpations: Abdomen is soft.   Musculoskeletal:         General: Normal range of motion.      Cervical back: Normal range of motion and neck supple.   Skin:     General: Skin is warm and dry.   Neurological:      General: No focal deficit present.      Mental Status: She is alert and oriented to person, place, and time. Mental status is at baseline.   Psychiatric:         Mood and Affect: Mood normal.          Labs:  Lab Results   Component Value Date    WBC 5.29 02/01/2025    RBC 4.67 02/01/2025    HGB 11.5 (L) 02/01/2025    HCT 38.0 02/01/2025    MCV 81 (L) 02/01/2025    MCH 24.6 (L) 02/01/2025    MCHC 30.3 (L) 02/01/2025    RDW 23.7 (H) 02/01/2025     02/01/2025    MPV 9.4 02/01/2025    GRAN 2.7 02/01/2025    GRAN 51.3 02/01/2025    LYMPH 2.0 02/01/2025    LYMPH 37.4 02/01/2025    MONO 0.4 02/01/2025    MONO 8.1 02/01/2025    EOS 0.1 02/01/2025    BASO 0.06 02/01/2025    EOSINOPHIL 1.9 02/01/2025    BASOPHIL 1.1 02/01/2025     Sodium   Date Value Ref Range Status   06/25/2008 142 136 - 145 mMol/l Final     Potassium   Date Value Ref Range Status   06/25/2008 3.6 3.5 - 5.1 mMol/l Final     Chloride   Date Value Ref Range Status   06/25/2008 108 95 - 110 mMol/l Final     CO2   Date Value Ref Range Status   06/25/2008 22 (L) 23.0 - 29.0 mEq/L Final     Glucose   Date Value Ref Range Status   06/25/2008 85 70 - 110 mg/dl Final     BUN   Date Value Ref Range Status   06/25/2008 9 6 - 20 mg/dl Final     Creatinine   Date Value Ref Range Status   06/25/2008 0.9 0.5 - 1.4 mg/dl Final     Calcium   Date Value Ref Range Status   06/25/2008 9.3 8.7 - 10.5 mg/dl Final     Total Protein   Date Value  Ref Range Status   06/25/2008 7.1 6.0 - 8.4 gm/dl Final     Albumin   Date Value Ref Range Status   06/25/2008 4.3 3.5 - 5.2 g/dl Final     Total Bilirubin   Date Value Ref Range Status   06/25/2008 0.9 0.1 - 1.0 mg/dl Final     Comment:     For infants and newborns, interpretation of results should be based  on gestational age, weight and in agreement with clinical  observations.  .  Premature Infant recommended reference ranges:  Up to 24 hours.............<8.0 mg/dl  Up to 48 hours............<12.0 mg/dl  3-5 days..................<15.0 mg/dl  6-29 days.................<15.0 mg/dl       Alkaline Phosphatase   Date Value Ref Range Status   06/25/2008 61 55 - 135 U/L Final     AST   Date Value Ref Range Status   06/25/2008 15 10 - 40 U/L Final     ALT   Date Value Ref Range Status   06/25/2008 13 10 - 44 U/L Final       A/P:    Iron deficiency anemia  -due to menorrhagia   -intolerant to oral iron with GI upset   -improved with 2 doses of Feraheme.  Still does not want to do oral iron.  I will recheck again in 3 months with labs as I anticipate her hemoglobin will improve but her ferritin will drop.    Restless leg syndrome   -improved with IV iron   -continue to monitor      Aurash Khoobehi, MD  Hematology and Oncology

## 2025-05-14 ENCOUNTER — TELEPHONE (OUTPATIENT)
Dept: HEMATOLOGY/ONCOLOGY | Facility: CLINIC | Age: 45
End: 2025-05-14
Payer: COMMERCIAL

## 2025-05-21 ENCOUNTER — TELEPHONE (OUTPATIENT)
Dept: HEMATOLOGY/ONCOLOGY | Facility: CLINIC | Age: 45
End: 2025-05-21
Payer: COMMERCIAL

## 2025-05-23 ENCOUNTER — LAB VISIT (OUTPATIENT)
Dept: LAB | Facility: HOSPITAL | Age: 45
End: 2025-05-23
Attending: INTERNAL MEDICINE
Payer: COMMERCIAL

## 2025-05-23 DIAGNOSIS — D50.0 IRON DEFICIENCY ANEMIA DUE TO CHRONIC BLOOD LOSS: ICD-10-CM

## 2025-05-23 LAB
ABSOLUTE EOSINOPHIL (OHS): 0.11 K/UL
ABSOLUTE MONOCYTE (OHS): 0.44 K/UL (ref 0.3–1)
ABSOLUTE NEUTROPHIL COUNT (OHS): 2.1 K/UL (ref 1.8–7.7)
BASOPHILS # BLD AUTO: 0.05 K/UL
BASOPHILS NFR BLD AUTO: 1.1 %
ERYTHROCYTE [DISTWIDTH] IN BLOOD BY AUTOMATED COUNT: 13.7 % (ref 11.5–14.5)
FERRITIN SERPL-MCNC: 10 NG/ML (ref 20–300)
HCT VFR BLD AUTO: 34.2 % (ref 37–48.5)
HGB BLD-MCNC: 10.6 GM/DL (ref 12–16)
IMM GRANULOCYTES # BLD AUTO: 0.01 K/UL (ref 0–0.04)
IMM GRANULOCYTES NFR BLD AUTO: 0.2 % (ref 0–0.5)
LYMPHOCYTES # BLD AUTO: 2.05 K/UL (ref 1–4.8)
MCH RBC QN AUTO: 26 PG (ref 27–31)
MCHC RBC AUTO-ENTMCNC: 31 G/DL (ref 32–36)
MCV RBC AUTO: 84 FL (ref 82–98)
NUCLEATED RBC (/100WBC) (OHS): 0 /100 WBC
PLATELET # BLD AUTO: 299 K/UL (ref 150–450)
PMV BLD AUTO: 9.3 FL (ref 9.2–12.9)
RBC # BLD AUTO: 4.08 M/UL (ref 4–5.4)
RELATIVE EOSINOPHIL (OHS): 2.3 %
RELATIVE LYMPHOCYTE (OHS): 43.1 % (ref 18–48)
RELATIVE MONOCYTE (OHS): 9.2 % (ref 4–15)
RELATIVE NEUTROPHIL (OHS): 44.1 % (ref 38–73)
WBC # BLD AUTO: 4.76 K/UL (ref 3.9–12.7)

## 2025-05-23 PROCEDURE — 82728 ASSAY OF FERRITIN: CPT

## 2025-05-23 PROCEDURE — 36415 COLL VENOUS BLD VENIPUNCTURE: CPT

## 2025-05-23 PROCEDURE — 85025 COMPLETE CBC W/AUTO DIFF WBC: CPT

## 2025-05-28 ENCOUNTER — OFFICE VISIT (OUTPATIENT)
Dept: HEMATOLOGY/ONCOLOGY | Facility: CLINIC | Age: 45
End: 2025-05-28
Payer: COMMERCIAL

## 2025-05-28 DIAGNOSIS — G25.81 RLS (RESTLESS LEGS SYNDROME): ICD-10-CM

## 2025-05-28 DIAGNOSIS — D50.0 IRON DEFICIENCY ANEMIA DUE TO CHRONIC BLOOD LOSS: Primary | ICD-10-CM

## 2025-05-28 PROCEDURE — 98006 SYNCH AUDIO-VIDEO EST MOD 30: CPT | Mod: 95,,, | Performed by: INTERNAL MEDICINE

## 2025-05-28 RX ORDER — SODIUM CHLORIDE 0.9 % (FLUSH) 0.9 %
10 SYRINGE (ML) INJECTION
OUTPATIENT
Start: 2025-05-28

## 2025-05-28 RX ORDER — HEPARIN 100 UNIT/ML
500 SYRINGE INTRAVENOUS
OUTPATIENT
Start: 2025-05-28

## 2025-05-28 RX ORDER — EPINEPHRINE 0.3 MG/.3ML
0.3 INJECTION SUBCUTANEOUS ONCE AS NEEDED
OUTPATIENT
Start: 2025-05-28

## 2025-05-28 NOTE — PROGRESS NOTES
The patient location is: [place]  Visit type: Virtual visit with synchronous audio and video  Face-to-face or time spent with patient on the encounter: 25 min  Total time spent on and for  this encounter which includes non face-to-face time preparing to see patient, review of tests, obtaining and or reviewing separately obtained records documenting clinical information in the electronic or other health records, independently interpreting results which is not separately reported ,and communicating results to the patient/family/caregiver and in care coordination and treatment planning/communicating with pharmacy for prescriptions/addressing social needs/arranging follow-up and or referrals : 25 min     Each patient I provide medical services by telemedicine is:  (1) informed of the relationship between the physician and patient and the respective role of any other health care provider with respect to management of the patient; and (2) notified that he or she may decline to receive medical services by telemedicine and may withdraw from such care at any time.  This is a video visit therefore some elements of the physical exam such as vital signs, heart sounds are breath sounds are not included and may be included if found in recent clinic notes of other providers assessing same patient. Any symptoms or signs that were visualized were stated by the patient may be included in this note.      Service Date:  5/28/25    Chief Complaint: iron deficiency anemia    Nuria Alvarado is a 45 y.o. female here with iron deficiency anemia.  Secondary to menorrhagia.  Has responded to IV iron in the past but iron level quickly drops.  Currently not having any symptoms of restless legs syndrome however.      Review of Systems   Constitutional: Negative.  Negative for appetite change and unexpected weight change.   HENT: Negative.  Negative for mouth sores.    Eyes: Negative.  Negative for visual disturbance.   Respiratory:  Negative.  Negative for cough and shortness of breath.    Cardiovascular: Negative.  Negative for chest pain.   Gastrointestinal: Negative.  Negative for abdominal pain and diarrhea.   Endocrine: Negative.    Genitourinary: Negative.  Negative for frequency.   Musculoskeletal: Negative.  Negative for back pain.   Integumentary:  Negative for rash. Negative.   Neurological: Negative.  Negative for headaches.   Hematological: Negative.  Negative for adenopathy.   Psychiatric/Behavioral:  The patient is nervous/anxious.         Current Outpatient Medications   Medication Instructions    buPROPion (WELLBUTRIN XL) 300 MG 24 hr tablet No dose, route, or frequency recorded.    iron fum,ps-FA-vit B,C#18-Lact (FUSION PLUS) 130 mg iron -1,250 mcg Cap 1 tablet, Oral, Every morning    MOUNJARO 2.5 mg, Subcutaneous, Every 7 days    omeprazole (PRILOSEC) 40 mg, Oral        Past Medical History:   Diagnosis Date    Anemia     Depression     Fibroids     Gastric reflux     History of abnormal cervical Pap smear     History of abnormal mammogram         Past Surgical History:   Procedure Laterality Date    BREAST BIOPSY  09/09/2022    benign    DILATION AND CURETTAGE OF UTERUS      ESOPHAGOGASTRODUODENOSCOPY      LIPOSUCTION      WISDOM TOOTH EXTRACTION          Family History   Problem Relation Name Age of Onset    Hypertension Maternal Grandmother      Alzheimer's disease Paternal Grandmother      Breast cancer Other AUNT     Diabetes Other         Social History     Tobacco Use    Smoking status: Never    Smokeless tobacco: Never   Substance Use Topics    Alcohol use: Yes     Comment: Social    Drug use: Never         There were no vitals filed for this visit.     Physical Exam:  There were no vitals taken for this visit.    Physical Exam  Constitutional:       Appearance: Normal appearance.   HENT:      Head: Normocephalic and atraumatic.      Nose: Nose normal.      Mouth/Throat:      Mouth: Mucous membranes are moist.       Pharynx: Oropharynx is clear.   Eyes:      Conjunctiva/sclera: Conjunctivae normal.   Cardiovascular:      Rate and Rhythm: Normal rate and regular rhythm.      Heart sounds: Normal heart sounds.   Pulmonary:      Effort: Pulmonary effort is normal.      Breath sounds: Normal breath sounds.   Abdominal:      General: Abdomen is flat. Bowel sounds are normal.      Palpations: Abdomen is soft.   Musculoskeletal:         General: Normal range of motion.      Cervical back: Normal range of motion and neck supple.   Skin:     General: Skin is warm and dry.   Neurological:      General: No focal deficit present.      Mental Status: She is alert and oriented to person, place, and time. Mental status is at baseline.   Psychiatric:         Mood and Affect: Mood normal.          Labs:  Lab Results   Component Value Date    WBC 4.76 05/23/2025    RBC 4.08 05/23/2025    HGB 10.6 (L) 05/23/2025    HCT 34.2 (L) 05/23/2025    MCV 84 05/23/2025    MCH 26.0 (L) 05/23/2025    MCHC 31.0 (L) 05/23/2025    RDW 13.7 05/23/2025     05/23/2025    MPV 9.3 05/23/2025    GRAN 2.7 02/01/2025    GRAN 51.3 02/01/2025    LYMPH 43.1 05/23/2025    LYMPH 2.05 05/23/2025    MONO 9.2 05/23/2025    MONO 0.44 05/23/2025    EOS 2.3 05/23/2025    EOS 0.11 05/23/2025    BASO 0.06 02/01/2025    EOSINOPHIL 1.9 02/01/2025    BASOPHIL 1.1 05/23/2025    BASOPHIL 0.05 05/23/2025     Sodium   Date Value Ref Range Status   06/25/2008 142 136 - 145 mMol/l Final     Potassium   Date Value Ref Range Status   06/25/2008 3.6 3.5 - 5.1 mMol/l Final     Chloride   Date Value Ref Range Status   06/25/2008 108 95 - 110 mMol/l Final     CO2   Date Value Ref Range Status   06/25/2008 22 (L) 23.0 - 29.0 mEq/L Final     Glucose   Date Value Ref Range Status   06/25/2008 85 70 - 110 mg/dl Final     BUN   Date Value Ref Range Status   06/25/2008 9 6 - 20 mg/dl Final     Creatinine   Date Value Ref Range Status   06/25/2008 0.9 0.5 - 1.4 mg/dl Final     Calcium   Date  Value Ref Range Status   06/25/2008 9.3 8.7 - 10.5 mg/dl Final     Total Protein   Date Value Ref Range Status   06/25/2008 7.1 6.0 - 8.4 gm/dl Final     Albumin   Date Value Ref Range Status   06/25/2008 4.3 3.5 - 5.2 g/dl Final     Total Bilirubin   Date Value Ref Range Status   06/25/2008 0.9 0.1 - 1.0 mg/dl Final     Comment:     For infants and newborns, interpretation of results should be based  on gestational age, weight and in agreement with clinical  observations.  .  Premature Infant recommended reference ranges:  Up to 24 hours.............<8.0 mg/dl  Up to 48 hours............<12.0 mg/dl  3-5 days..................<15.0 mg/dl  6-29 days.................<15.0 mg/dl       Alkaline Phosphatase   Date Value Ref Range Status   06/25/2008 61 55 - 135 U/L Final     AST   Date Value Ref Range Status   06/25/2008 15 10 - 40 U/L Final     ALT   Date Value Ref Range Status   06/25/2008 13 10 - 44 U/L Final       A/P:    Iron deficiency anemia  -due to menorrhagia   -intolerant to oral iron with GI upset   -as she only had minimal improvement with Feraheme I will give her Injectafer for hopefully a better improvement.  Return to clinic 4 weeks after with labs.    Restless leg syndrome   -continue to monitor   -currently not a issue with low iron level.      Aurash Khoobehi, MD  Hematology and Oncology

## 2025-06-09 ENCOUNTER — TELEPHONE (OUTPATIENT)
Dept: INFUSION THERAPY | Facility: HOSPITAL | Age: 45
End: 2025-06-09

## 2025-06-09 NOTE — TELEPHONE ENCOUNTER
Left a message for Ms. Alvarado informing her that we have received notification there her [facility] insurance inactive at this time. Informed her to contact a  with updated information or if this information is not correct. If a new insurance provider has been obtained we are unable to administer her treatment until authorization has been received. Also informed her that if she does not have coverage we are unable to administer treatment until coverage is obtained or she has spoken with a  where she will need to discuss financial responsibility. Infusion notified.

## 2025-06-10 ENCOUNTER — TELEPHONE (OUTPATIENT)
Dept: HEMATOLOGY/ONCOLOGY | Facility: CLINIC | Age: 45
End: 2025-06-10

## 2025-06-10 NOTE — TELEPHONE ENCOUNTER
Left vm to notify pt that this nurse was notified that pt does not have active ins coverage and infusion is unable to schedule IV iron. Asked pt to return call and let us know if she does have ins so that we can add this to her chart. Otherwise, we can have pt contact central pricing for a self-pay estimate.

## 2025-07-01 ENCOUNTER — TELEPHONE (OUTPATIENT)
Dept: HEMATOLOGY/ONCOLOGY | Facility: CLINIC | Age: 45
End: 2025-07-01
Payer: COMMERCIAL

## 2025-07-01 ENCOUNTER — LAB VISIT (OUTPATIENT)
Dept: LAB | Facility: HOSPITAL | Age: 45
End: 2025-07-01
Attending: INTERNAL MEDICINE
Payer: COMMERCIAL

## 2025-07-01 DIAGNOSIS — D50.0 IRON DEFICIENCY ANEMIA DUE TO CHRONIC BLOOD LOSS: Primary | ICD-10-CM

## 2025-07-01 DIAGNOSIS — D50.0 IRON DEFICIENCY ANEMIA DUE TO CHRONIC BLOOD LOSS: ICD-10-CM

## 2025-07-01 LAB
ABSOLUTE EOSINOPHIL (OHS): 0.13 K/UL
ABSOLUTE MONOCYTE (OHS): 0.6 K/UL (ref 0.3–1)
ABSOLUTE NEUTROPHIL COUNT (OHS): 2.97 K/UL (ref 1.8–7.7)
BASOPHILS # BLD AUTO: 0.07 K/UL
BASOPHILS NFR BLD AUTO: 1.2 %
ERYTHROCYTE [DISTWIDTH] IN BLOOD BY AUTOMATED COUNT: 13.6 % (ref 11.5–14.5)
FERRITIN SERPL-MCNC: 7 NG/ML (ref 20–300)
HCT VFR BLD AUTO: 33 % (ref 37–48.5)
HGB BLD-MCNC: 10.2 GM/DL (ref 12–16)
IMM GRANULOCYTES # BLD AUTO: 0.01 K/UL (ref 0–0.04)
IMM GRANULOCYTES NFR BLD AUTO: 0.2 % (ref 0–0.5)
IRON SATN MFR SERPL: 4 % (ref 20–50)
IRON SERPL-MCNC: 18 UG/DL (ref 30–160)
LYMPHOCYTES # BLD AUTO: 2.11 K/UL (ref 1–4.8)
MCH RBC QN AUTO: 24.5 PG (ref 27–31)
MCHC RBC AUTO-ENTMCNC: 30.9 G/DL (ref 32–36)
MCV RBC AUTO: 79 FL (ref 82–98)
NUCLEATED RBC (/100WBC) (OHS): 0 /100 WBC
PLATELET # BLD AUTO: 328 K/UL (ref 150–450)
PMV BLD AUTO: 8.1 FL (ref 9.2–12.9)
RBC # BLD AUTO: 4.17 M/UL (ref 4–5.4)
RELATIVE EOSINOPHIL (OHS): 2.2 %
RELATIVE LYMPHOCYTE (OHS): 35.8 % (ref 18–48)
RELATIVE MONOCYTE (OHS): 10.2 % (ref 4–15)
RELATIVE NEUTROPHIL (OHS): 50.4 % (ref 38–73)
TIBC SERPL-MCNC: 456 UG/DL (ref 250–450)
TRANSFERRIN SERPL-MCNC: 308 MG/DL (ref 200–375)
WBC # BLD AUTO: 5.89 K/UL (ref 3.9–12.7)

## 2025-07-01 PROCEDURE — 36415 COLL VENOUS BLD VENIPUNCTURE: CPT

## 2025-07-01 PROCEDURE — 83540 ASSAY OF IRON: CPT

## 2025-07-01 PROCEDURE — 85025 COMPLETE CBC W/AUTO DIFF WBC: CPT

## 2025-07-01 PROCEDURE — 82728 ASSAY OF FERRITIN: CPT

## 2025-07-01 NOTE — TELEPHONE ENCOUNTER
LVM with pt regarding getting new iron labs drawn for insurance authorization of IV iron.     ----- Message from Rina sent at 7/1/2025  8:22 AM CDT -----  Pt would like to r/s infusion, she has new ins    Cb [phone]

## 2025-07-06 RX ORDER — EPINEPHRINE 0.3 MG/.3ML
0.3 INJECTION SUBCUTANEOUS ONCE AS NEEDED
OUTPATIENT
Start: 2025-07-07

## 2025-07-06 RX ORDER — SODIUM CHLORIDE 0.9 % (FLUSH) 0.9 %
10 SYRINGE (ML) INJECTION
OUTPATIENT
Start: 2025-07-07

## 2025-07-06 RX ORDER — HEPARIN 100 UNIT/ML
500 SYRINGE INTRAVENOUS
OUTPATIENT
Start: 2025-07-07

## 2025-07-07 ENCOUNTER — PATIENT MESSAGE (OUTPATIENT)
Dept: INFUSION THERAPY | Facility: HOSPITAL | Age: 45
End: 2025-07-07
Payer: COMMERCIAL

## 2025-07-15 ENCOUNTER — INFUSION (OUTPATIENT)
Dept: INFUSION THERAPY | Facility: HOSPITAL | Age: 45
End: 2025-07-15
Attending: INTERNAL MEDICINE
Payer: COMMERCIAL

## 2025-07-15 VITALS
RESPIRATION RATE: 16 BRPM | HEART RATE: 74 BPM | TEMPERATURE: 98 F | DIASTOLIC BLOOD PRESSURE: 86 MMHG | OXYGEN SATURATION: 100 % | SYSTOLIC BLOOD PRESSURE: 128 MMHG

## 2025-07-15 DIAGNOSIS — D50.8 IRON DEFICIENCY ANEMIA SECONDARY TO INADEQUATE DIETARY IRON INTAKE: Primary | ICD-10-CM

## 2025-07-15 PROCEDURE — 96374 THER/PROPH/DIAG INJ IV PUSH: CPT

## 2025-07-15 PROCEDURE — 63600175 PHARM REV CODE 636 W HCPCS: Mod: JZ,TB | Performed by: INTERNAL MEDICINE

## 2025-07-15 RX ORDER — EPINEPHRINE 0.3 MG/.3ML
0.3 INJECTION SUBCUTANEOUS ONCE AS NEEDED
OUTPATIENT
Start: 2025-07-22

## 2025-07-15 RX ORDER — HEPARIN 100 UNIT/ML
500 SYRINGE INTRAVENOUS
OUTPATIENT
Start: 2025-07-22

## 2025-07-15 RX ORDER — SODIUM CHLORIDE 0.9 % (FLUSH) 0.9 %
10 SYRINGE (ML) INJECTION
OUTPATIENT
Start: 2025-07-22

## 2025-07-15 RX ADMIN — FERRIC CARBOXYMALTOSE INJECTION 750 MG: 50 INJECTION, SOLUTION INTRAVENOUS at 03:07

## 2025-07-15 NOTE — PLAN OF CARE
Problem: Adult Inpatient Plan of Care  Goal: Plan of Care Review  Outcome: Progressing  Flowsheets (Taken 7/15/2025 1636)  Plan of Care Reviewed With: patient  Goal: Patient-Specific Goal (Individualized)  Outcome: Progressing  Flowsheets (Taken 7/15/2025 1636)  Individualized Care Needs: none  Anxieties, Fears or Concerns: none     Problem: Anemia  Goal: Anemia Symptom Improvement  Outcome: Progressing

## 2025-07-15 NOTE — DISCHARGE INSTRUCTIONS
[facility]  [address] [address]  [address]  [phone] phone     [phone] fax  Hours of Operation: Monday- Friday 8:00am- 5:00pm  After hours phone  [phone]  Hematology / Oncology Physicians on call      GABBIE Mg Dr., NP Phaon Dunbar, NP Khelsea Conley, FNP    Please call with any concerns regarding your appointment today.

## 2025-07-29 ENCOUNTER — INFUSION (OUTPATIENT)
Dept: INFUSION THERAPY | Facility: HOSPITAL | Age: 45
End: 2025-07-29
Attending: INTERNAL MEDICINE
Payer: COMMERCIAL

## 2025-07-29 VITALS
OXYGEN SATURATION: 99 % | RESPIRATION RATE: 16 BRPM | TEMPERATURE: 98 F | HEART RATE: 75 BPM | SYSTOLIC BLOOD PRESSURE: 116 MMHG | DIASTOLIC BLOOD PRESSURE: 75 MMHG

## 2025-07-29 DIAGNOSIS — D50.8 IRON DEFICIENCY ANEMIA SECONDARY TO INADEQUATE DIETARY IRON INTAKE: Primary | ICD-10-CM

## 2025-07-29 PROCEDURE — 96374 THER/PROPH/DIAG INJ IV PUSH: CPT

## 2025-07-29 PROCEDURE — 63600175 PHARM REV CODE 636 W HCPCS: Mod: JZ,TB | Performed by: INTERNAL MEDICINE

## 2025-07-29 RX ORDER — SODIUM CHLORIDE 0.9 % (FLUSH) 0.9 %
10 SYRINGE (ML) INJECTION
OUTPATIENT
Start: 2025-07-29

## 2025-07-29 RX ORDER — HEPARIN 100 UNIT/ML
500 SYRINGE INTRAVENOUS
OUTPATIENT
Start: 2025-07-29

## 2025-07-29 RX ORDER — EPINEPHRINE 0.3 MG/.3ML
0.3 INJECTION SUBCUTANEOUS ONCE AS NEEDED
OUTPATIENT
Start: 2025-07-29

## 2025-07-29 RX ADMIN — FERRIC CARBOXYMALTOSE INJECTION 750 MG: 50 INJECTION, SOLUTION INTRAVENOUS at 03:07

## 2025-07-29 NOTE — DISCHARGE INSTRUCTIONS
.[facility]  [address] [address]  [address]  [phone] phone     [phone] fax  Hours of Operation: Monday- Friday 8:00am- 5:00pm  After hours phone  [phone]  Hematology / Oncology Physicians on call    Dr. Alon Vyas           Nurse Practitioners:     Amanda Boggs, STEPHY Card, SOUTH Diallo, STEPHY Regalado, STEPHY Stokes, NP    Please don't hesitate to call if you have any concerns.      FALL PREVENTION   Falls often occur due to slipping, tripping or losing your balance. Here are ways to reduce your risk of falling again.   Was there anything that caused your fall that can be fixed, removed or replaced?   Make your home safe by keeping walkways clear of objects you may trip over.   Use non-slip pads under rugs.   Do not walk in poorly lit areas.   Do not stand on chairs or wobbly ladders.   Use caution when reaching overhead or looking upward. This position can cause a loss of balance.   Be sure your shoes fit properly, have non-slip bottoms and are in good condition.   Be cautious when going up and down stairs, curbs, and when walking on uneven sidewalks.   If your balance is poor, consider using a cane or walker.   If your fall was related to alcohol use, stop or limit alcohol intake.   If your fall was related to use of sleeping medicines, talk to your doctor about this. You may need to reduce your dosage at bedtime if you awaken during the night to go to the bathroom.   To reduce the need for nighttime bathroom trips:   Avoid drinking fluids for several hours before going to bed   Empty your bladder before going to bed   Men can keep a urinal at the bedside   © 0682-7117 [employer], [address] [place] [address], [place] [address]. All rights reserved. This information is not intended as a substitute for professional medical care. Always follow your healthcare  professional's instructions.    WAYS TO HELP PREVENT INFECTION        WASH YOUR HANDS OFTEN DURING THE DAY, ESPECIALLY BEFORE YOU EAT, AFTER USING THE BATHROOM, AND AFTER TOUCHING ANIMALS    STAY AWAY FROM PEOPLE WHO HAVE ILLNESSES YOU CAN CATCH; SUCH AS COLDS, FLU, CHICKEN POX    TRY TO AVOID CROWDS    STAY AWAY FROM CHILDREN WHO RECENTLY HAVE RECEIVED LIVE VIRUS VACCINES    MAINTAIN GOOD MOUTH CARE    DO NOT SQUEEZE OR SCRATCH PIMPLES    CLEAN CUTS & SCRAPES RIGHT AWAY AND DAILY UNTIL HEALED WITH WARM WATER, SOAP & AN ANTISEPTIC    AVOID CONTACT WITH LITTER BOXES, BIRD CAGES, & FISH TANKS    AVOID STANDING WATER, IE., BIRD BATHS, FLOWER POTS/VASES, OR HUMIDIFIERS    WEAR GLOVES WHEN GARDENING OR CLEANING UP AFTER OTHERS, ESPECIALLY BABIES & SMALL CHILDREN    DO NOT EAT RAW FISH, SEAFOOD, MEAT, OR EGGS

## 2025-07-29 NOTE — PLAN OF CARE
Problem: Adult Inpatient Plan of Care  Goal: Plan of Care Review  Outcome: Progressing  Flowsheets (Taken 7/29/2025 1546)  Plan of Care Reviewed With: patient  Goal: Patient-Specific Goal (Individualized)  Outcome: Progressing  Flowsheets (Taken 7/29/2025 1546)  Individualized Care Needs: Warm blanket given. Pillow under arm with IV- Right ac was her preference  Anxieties, Fears or Concerns: Only concern is that she feels like this brand of iron isn't working like the last one. She received Feraheme previously  Patient/Family-Specific Goals (Include Timeframe): Tolerated iron IVP today     Problem: Anemia  Goal: Anemia Symptom Improvement  Outcome: Progressing  Intervention: Monitor and Manage Anemia  Flowsheets (Taken 7/29/2025 1546)  Safety Promotion/Fall Prevention:   medications reviewed   in recliner, wheels locked   instructed to call staff for mobility   high risk medications identified   lighting adjusted  Fatigue Management: frequent rest breaks encouraged

## 2025-09-03 ENCOUNTER — TELEPHONE (OUTPATIENT)
Dept: HEMATOLOGY/ONCOLOGY | Facility: CLINIC | Age: 45
End: 2025-09-03
Payer: COMMERCIAL